# Patient Record
Sex: FEMALE | Race: BLACK OR AFRICAN AMERICAN | Employment: UNEMPLOYED | URBAN - METROPOLITAN AREA
[De-identification: names, ages, dates, MRNs, and addresses within clinical notes are randomized per-mention and may not be internally consistent; named-entity substitution may affect disease eponyms.]

---

## 2018-08-20 ENCOUNTER — HOSPITAL ENCOUNTER (EMERGENCY)
Age: 63
Discharge: PSYCHIATRIC OR SUBSTANCE USE FACILITY | End: 2018-08-21
Attending: EMERGENCY MEDICINE
Payer: MEDICARE

## 2018-08-20 DIAGNOSIS — F32.A ACUTE DEPRESSION: ICD-10-CM

## 2018-08-20 DIAGNOSIS — R45.851 SUICIDAL IDEATION: Primary | ICD-10-CM

## 2018-08-20 LAB
ALBUMIN SERPL-MCNC: 4.2 G/DL (ref 3.4–5)
ALBUMIN/GLOB SERPL: 1.1 {RATIO} (ref 0.8–1.7)
ALP SERPL-CCNC: 70 U/L (ref 45–117)
ALT SERPL-CCNC: 22 U/L (ref 13–56)
AMPHET UR QL SCN: NEGATIVE
ANION GAP SERPL CALC-SCNC: 10 MMOL/L (ref 3–18)
APAP SERPL-MCNC: <2 UG/ML (ref 10–30)
APPEARANCE UR: CLEAR
AST SERPL-CCNC: 17 U/L (ref 15–37)
BACTERIA URNS QL MICRO: NEGATIVE /HPF
BARBITURATES UR QL SCN: NEGATIVE
BASOPHILS # BLD: 0 K/UL (ref 0–0.1)
BASOPHILS NFR BLD: 0 % (ref 0–2)
BENZODIAZ UR QL: NEGATIVE
BILIRUB SERPL-MCNC: 0.8 MG/DL (ref 0.2–1)
BILIRUB UR QL: NEGATIVE
BUN SERPL-MCNC: 13 MG/DL (ref 7–18)
BUN/CREAT SERPL: 15 (ref 12–20)
CALCIUM SERPL-MCNC: 9.8 MG/DL (ref 8.5–10.1)
CANNABINOIDS UR QL SCN: NEGATIVE
CHLORIDE SERPL-SCNC: 108 MMOL/L (ref 100–108)
CO2 SERPL-SCNC: 23 MMOL/L (ref 21–32)
COCAINE UR QL SCN: NEGATIVE
COLOR UR: YELLOW
CREAT SERPL-MCNC: 0.89 MG/DL (ref 0.6–1.3)
DIFFERENTIAL METHOD BLD: ABNORMAL
EOSINOPHIL # BLD: 0 K/UL (ref 0–0.4)
EOSINOPHIL NFR BLD: 1 % (ref 0–5)
EPITH CASTS URNS QL MICRO: NORMAL /LPF (ref 0–5)
ERYTHROCYTE [DISTWIDTH] IN BLOOD BY AUTOMATED COUNT: 15 % (ref 11.6–14.5)
ETHANOL SERPL-MCNC: <3 MG/DL (ref 0–3)
GLOBULIN SER CALC-MCNC: 3.9 G/DL (ref 2–4)
GLUCOSE SERPL-MCNC: 128 MG/DL (ref 74–99)
GLUCOSE UR STRIP.AUTO-MCNC: NEGATIVE MG/DL
HCT VFR BLD AUTO: 40.3 % (ref 35–45)
HDSCOM,HDSCOM: NORMAL
HGB BLD-MCNC: 13.6 G/DL (ref 12–16)
HGB UR QL STRIP: NEGATIVE
KETONES UR QL STRIP.AUTO: NEGATIVE MG/DL
LEUKOCYTE ESTERASE UR QL STRIP.AUTO: NEGATIVE
LYMPHOCYTES # BLD: 1.2 K/UL (ref 0.9–3.6)
LYMPHOCYTES NFR BLD: 23 % (ref 21–52)
MCH RBC QN AUTO: 28.9 PG (ref 24–34)
MCHC RBC AUTO-ENTMCNC: 33.7 G/DL (ref 31–37)
MCV RBC AUTO: 85.7 FL (ref 74–97)
METHADONE UR QL: NEGATIVE
MONOCYTES # BLD: 0.4 K/UL (ref 0.05–1.2)
MONOCYTES NFR BLD: 8 % (ref 3–10)
NEUTS SEG # BLD: 3.6 K/UL (ref 1.8–8)
NEUTS SEG NFR BLD: 68 % (ref 40–73)
NITRITE UR QL STRIP.AUTO: NEGATIVE
OPIATES UR QL: NEGATIVE
PCP UR QL: NEGATIVE
PH UR STRIP: 5.5 [PH] (ref 5–8)
PLATELET # BLD AUTO: 170 K/UL (ref 135–420)
PMV BLD AUTO: 10.4 FL (ref 9.2–11.8)
POTASSIUM SERPL-SCNC: 3.9 MMOL/L (ref 3.5–5.5)
PROT SERPL-MCNC: 8.1 G/DL (ref 6.4–8.2)
PROT UR STRIP-MCNC: 30 MG/DL
RBC # BLD AUTO: 4.7 M/UL (ref 4.2–5.3)
RBC #/AREA URNS HPF: 0 /HPF (ref 0–5)
SALICYLATES SERPL-MCNC: <2.8 MG/DL (ref 2.8–20)
SODIUM SERPL-SCNC: 141 MMOL/L (ref 136–145)
SP GR UR REFRACTOMETRY: 1.02 (ref 1–1.03)
UROBILINOGEN UR QL STRIP.AUTO: 0.2 EU/DL (ref 0.2–1)
WBC # BLD AUTO: 5.3 K/UL (ref 4.6–13.2)
WBC URNS QL MICRO: NORMAL /HPF (ref 0–4)

## 2018-08-20 PROCEDURE — 99285 EMERGENCY DEPT VISIT HI MDM: CPT

## 2018-08-20 PROCEDURE — 81001 URINALYSIS AUTO W/SCOPE: CPT | Performed by: EMERGENCY MEDICINE

## 2018-08-20 PROCEDURE — 80307 DRUG TEST PRSMV CHEM ANLYZR: CPT | Performed by: EMERGENCY MEDICINE

## 2018-08-20 PROCEDURE — 85025 COMPLETE CBC W/AUTO DIFF WBC: CPT | Performed by: EMERGENCY MEDICINE

## 2018-08-20 PROCEDURE — 74011250637 HC RX REV CODE- 250/637: Performed by: EMERGENCY MEDICINE

## 2018-08-20 PROCEDURE — 80053 COMPREHEN METABOLIC PANEL: CPT | Performed by: EMERGENCY MEDICINE

## 2018-08-20 RX ORDER — ACETAMINOPHEN 500 MG
1000 TABLET ORAL
Status: COMPLETED | OUTPATIENT
Start: 2018-08-20 | End: 2018-08-20

## 2018-08-20 RX ADMIN — ACETAMINOPHEN 1000 MG: 500 TABLET, FILM COATED ORAL at 20:24

## 2018-08-20 NOTE — ED PROVIDER NOTES
HPI Comments: Kristan Aguilar is a 66 y.o. Female who was brought in by family and pt self referred for increased depressive sx today along with feeling of self harm which is related to year anniversary of family member death. No particular plan or h/o prior admission for mental health, si. H/o depression and states she takes medication. Denies hallucinations. Sx are constant. Dec appetite, sleep. The history is provided by the patient. Past Medical History:   Diagnosis Date    Chronic kidney disease     Hearing loss     HTN (hypertension)     Hypercholesteremia     Leg cramps     Mixed hyperlipidemia        Past Surgical History:   Procedure Laterality Date    HX CATARACT REMOVAL      HX  SECTION           Family History:   Problem Relation Age of Onset    Heart Failure Father          Thyroid Disease Mother     Heart Failure Maternal Grandmother     Hypertension Maternal Grandmother        Social History     Social History    Marital status:      Spouse name: N/A    Number of children: N/A    Years of education: N/A     Occupational History    Not on file. Social History Main Topics    Smoking status: Never Smoker    Smokeless tobacco: Never Used    Alcohol use No    Drug use: No    Sexual activity: No     Other Topics Concern    Not on file     Social History Narrative    No narrative on file         ALLERGIES: Heparin (bovine) and Hydralazine    Review of Systems   Constitutional: Positive for appetite change and fatigue. Negative for fever. HENT: Negative for sore throat and trouble swallowing. Eyes: Negative for visual disturbance. Respiratory: Negative for cough and shortness of breath. Cardiovascular: Negative for chest pain. Gastrointestinal: Negative for abdominal pain. Endocrine: Negative for polyuria. Genitourinary: Negative for difficulty urinating. Musculoskeletal: Negative for gait problem. Skin: Negative for rash. Allergic/Immunologic: Negative for immunocompromised state. Neurological: Negative for syncope. Psychiatric/Behavioral: Positive for sleep disturbance and suicidal ideas. Negative for hallucinations. The patient is nervous/anxious. Vitals:    08/20/18 1904   BP: 106/77   Pulse: 90   Resp: 16   Temp: 97.3 °F (36.3 °C)   SpO2: 100%            Physical Exam   Constitutional: She is oriented to person, place, and time. She appears well-developed and well-nourished. She appears distressed (tearful in room. not ill appearance.). HENT:   Head: Normocephalic and atraumatic. Right Ear: External ear normal.   Left Ear: External ear normal.   Nose: Nose normal.   Mouth/Throat: Uvula is midline, oropharynx is clear and moist and mucous membranes are normal.   Eyes: Conjunctivae are normal. No scleral icterus. Neck: Neck supple. Cardiovascular: Normal rate, regular rhythm, normal heart sounds and intact distal pulses. Pulmonary/Chest: Effort normal and breath sounds normal.   Abdominal: Soft. There is no tenderness. Musculoskeletal: She exhibits no edema. Neurological: She is alert and oriented to person, place, and time. Gait normal.   Skin: Skin is warm and dry. She is not diaphoretic. Psychiatric: Her speech is normal and behavior is normal. Her mood appears anxious. Thought content is not paranoid and not delusional. She exhibits a depressed mood. She expresses suicidal ideation. She expresses no homicidal ideation. She expresses no suicidal plans and no homicidal plans. Nursing note and vitals reviewed.        [facility]      ED Course       Procedures  Vitals:  Patient Vitals for the past 12 hrs:   Temp Pulse Resp BP SpO2   08/20/18 1904 97.3 °F (36.3 °C) 90 16 106/77 100 %         Medications ordered:   Medications   acetaminophen (TYLENOL) tablet 1,000 mg (1,000 mg Oral Given 8/20/18 2024)         Lab findings:  Recent Results (from the past 12 hour(s))   URINALYSIS W/ RFLX MICROSCOPIC Collection Time: 08/20/18  7:15 PM   Result Value Ref Range    Color YELLOW      Appearance CLEAR      Specific gravity 1.018 1.005 - 1.030      pH (UA) 5.5 5.0 - 8.0      Protein 30 (A) NEG mg/dL    Glucose NEGATIVE  NEG mg/dL    Ketone NEGATIVE  NEG mg/dL    Bilirubin NEGATIVE  NEG      Blood NEGATIVE  NEG      Urobilinogen 0.2 0.2 - 1.0 EU/dL    Nitrites NEGATIVE  NEG      Leukocyte Esterase NEGATIVE  NEG     DRUG SCREEN, URINE    Collection Time: 08/20/18  7:15 PM   Result Value Ref Range    BENZODIAZEPINES NEGATIVE  NEG      BARBITURATES NEGATIVE  NEG      THC (TH-CANNABINOL) NEGATIVE  NEG      OPIATES NEGATIVE  NEG      PCP(PHENCYCLIDINE) NEGATIVE  NEG      COCAINE NEGATIVE  NEG      AMPHETAMINES NEGATIVE  NEG      METHADONE NEGATIVE  NEG      HDSCOM (NOTE)    URINE MICROSCOPIC ONLY    Collection Time: 08/20/18  7:15 PM   Result Value Ref Range    WBC 0 to 2 0 - 4 /hpf    RBC 0 0 - 5 /hpf    Epithelial cells FEW 0 - 5 /lpf    Bacteria NEGATIVE  NEG /hpf   CBC WITH AUTOMATED DIFF    Collection Time: 08/20/18  7:16 PM   Result Value Ref Range    WBC 5.3 4.6 - 13.2 K/uL    RBC 4.70 4.20 - 5.30 M/uL    HGB 13.6 12.0 - 16.0 g/dL    HCT 40.3 35.0 - 45.0 %    MCV 85.7 74.0 - 97.0 FL    MCH 28.9 24.0 - 34.0 PG    MCHC 33.7 31.0 - 37.0 g/dL    RDW 15.0 (H) 11.6 - 14.5 %    PLATELET 955 665 - 860 K/uL    MPV 10.4 9.2 - 11.8 FL    NEUTROPHILS 68 40 - 73 %    LYMPHOCYTES 23 21 - 52 %    MONOCYTES 8 3 - 10 %    EOSINOPHILS 1 0 - 5 %    BASOPHILS 0 0 - 2 %    ABS. NEUTROPHILS 3.6 1.8 - 8.0 K/UL    ABS. LYMPHOCYTES 1.2 0.9 - 3.6 K/UL    ABS. MONOCYTES 0.4 0.05 - 1.2 K/UL    ABS. EOSINOPHILS 0.0 0.0 - 0.4 K/UL    ABS.  BASOPHILS 0.0 0.0 - 0.1 K/UL    DF AUTOMATED     METABOLIC PANEL, COMPREHENSIVE    Collection Time: 08/20/18  7:16 PM   Result Value Ref Range    Sodium 141 136 - 145 mmol/L    Potassium 3.9 3.5 - 5.5 mmol/L    Chloride 108 100 - 108 mmol/L    CO2 23 21 - 32 mmol/L    Anion gap 10 3.0 - 18 mmol/L    Glucose 128 (H) 74 - 99 mg/dL    BUN 13 7.0 - 18 MG/DL    Creatinine 0.89 0.6 - 1.3 MG/DL    BUN/Creatinine ratio 15 12 - 20      GFR est AA >60 >60 ml/min/1.73m2    GFR est non-AA >60 >60 ml/min/1.73m2    Calcium 9.8 8.5 - 10.1 MG/DL    Bilirubin, total 0.8 0.2 - 1.0 MG/DL    ALT (SGPT) 22 13 - 56 U/L    AST (SGOT) 17 15 - 37 U/L    Alk. phosphatase 70 45 - 117 U/L    Protein, total 8.1 6.4 - 8.2 g/dL    Albumin 4.2 3.4 - 5.0 g/dL    Globulin 3.9 2.0 - 4.0 g/dL    A-G Ratio 1.1 0.8 - 1.7     ETHYL ALCOHOL    Collection Time: 08/20/18  7:16 PM   Result Value Ref Range    ALCOHOL(ETHYL),SERUM <3 0 - 3 MG/DL   SALICYLATE    Collection Time: 08/20/18  7:16 PM   Result Value Ref Range    Salicylate level <3.8 (L) 2.8 - 20.0 MG/DL   ACETAMINOPHEN    Collection Time: 08/20/18  7:16 PM   Result Value Ref Range    Acetaminophen level <2 (L) 10.0 - 30.0 ug/mL       EKG interpretation by ED Physician:      X-Ray, CT or other radiology findings or impressions:  No orders to display       Progress notes, Consult notes or additional Procedure notes:   Seen by telepsych who rec inpt treatment to which pt is voluntary  No acute medical condition that would prevent placement in mental health facility  Will t/o dr serrato at approx 6am to f/u on placement    Reevaluation of patient:   stable    Disposition:  Diagnosis:   1. Suicidal ideation    2. Acute depression        Disposition: pending placement    Follow-up Information     None            Patient's Medications   Start Taking    No medications on file   Continue Taking    ACETAMINOPHEN (TYLENOL PO)    Take  by mouth. AMLODIPINE (NORVASC) 5 MG TABLET    Take 5 mg by mouth daily. ATORVASTATIN (LIPITOR) 80 MG TABLET    Take 80 mg by mouth daily. AZILSARTAN MEDOXOMIL (EDARBI) 80 MG TAB    Take  by mouth. CHOLECALCIFEROL, VITAMIN D3, (VITAMIN D3) 2,000 UNIT TAB    Take  by mouth. DILTIAZEM CD (CARDIZEM CD) 120 MG ER CAPSULE    Take  by mouth daily. HYDROCODONE-ACETAMINOPHEN (NORCO) 5-325 MG PER TABLET    Take 1 Tab by mouth every six (6) hours as needed for Pain.    These Medications have changed    No medications on file   Stop Taking    No medications on file

## 2018-08-20 NOTE — ED TRIAGE NOTES
\"I feel suicidal. It's been an ongoing gamez but it's never been this bad. \" Per daughter-\"Today is the anniversary of my sister's passing. \"

## 2018-08-20 NOTE — ED NOTES
I performed a brief evaluation, including history and physical, of the patient here in triage and I have determined that pt will need further treatment and evaluation from the main side ER physician. I have placed initial orders to help in expediting patients care. August 20, 2018 at 7:05 PM - TORIBIO Loya      Visit Vitals    /77    Pulse 90    Temp 97.3 °F (36.3 °C)    Resp 16    SpO2 100%        Scribe Attestation     Stormy Gray acting as a scribe for and in the presence of TORIBIO Loya         August 20, 2018 at 7:05 PM       Provider Attestation:      I personally performed the services described in the documentation, reviewed the documentation, as recorded by the scribe in my presence, and it accurately and completely records my words and actions.  August 20, 2018 at 7:05 PM -  TORIBIO Loya

## 2018-08-20 NOTE — ED NOTES
Patient tearful. Daughter at bedside. Daughter reports that patient was in the bath tub and was suicidal. Patient admits to this.

## 2018-08-21 ENCOUNTER — HOSPITAL ENCOUNTER (INPATIENT)
Age: 63
LOS: 3 days | Discharge: HOME | DRG: 885 | End: 2018-08-24
Attending: PSYCHIATRY & NEUROLOGY | Admitting: PSYCHIATRY & NEUROLOGY
Payer: MEDICARE

## 2018-08-21 VITALS
RESPIRATION RATE: 18 BRPM | SYSTOLIC BLOOD PRESSURE: 147 MMHG | DIASTOLIC BLOOD PRESSURE: 102 MMHG | TEMPERATURE: 97.9 F | OXYGEN SATURATION: 96 % | HEART RATE: 76 BPM

## 2018-08-21 PROBLEM — F32.A DEPRESSION: Status: ACTIVE | Noted: 2018-08-21

## 2018-08-21 PROCEDURE — 65220000003 HC RM SEMIPRIVATE PSYCH

## 2018-08-21 PROCEDURE — 74011250637 HC RX REV CODE- 250/637: Performed by: EMERGENCY MEDICINE

## 2018-08-21 PROCEDURE — 74011250637 HC RX REV CODE- 250/637: Performed by: PSYCHIATRY & NEUROLOGY

## 2018-08-21 RX ORDER — ATENOLOL 50 MG/1
50 TABLET ORAL
Status: COMPLETED | OUTPATIENT
Start: 2018-08-21 | End: 2018-08-21

## 2018-08-21 RX ORDER — AMLODIPINE BESYLATE 5 MG/1
5 TABLET ORAL
Status: COMPLETED | OUTPATIENT
Start: 2018-08-21 | End: 2018-08-21

## 2018-08-21 RX ORDER — ATORVASTATIN CALCIUM 40 MG/1
80 TABLET, FILM COATED ORAL DAILY
Status: DISCONTINUED | OUTPATIENT
Start: 2018-08-22 | End: 2018-08-22

## 2018-08-21 RX ORDER — LORAZEPAM 1 MG/1
1 TABLET ORAL
Status: DISCONTINUED | OUTPATIENT
Start: 2018-08-21 | End: 2018-08-21

## 2018-08-21 RX ORDER — ADHESIVE BANDAGE
30 BANDAGE TOPICAL DAILY PRN
Status: DISCONTINUED | OUTPATIENT
Start: 2018-08-21 | End: 2018-08-24 | Stop reason: HOSPADM

## 2018-08-21 RX ORDER — ACETAMINOPHEN 325 MG/1
650 TABLET ORAL
Status: DISCONTINUED | OUTPATIENT
Start: 2018-08-21 | End: 2018-08-24 | Stop reason: HOSPADM

## 2018-08-21 RX ORDER — TELMISARTAN 40 MG/1
80 TABLET ORAL DAILY
Status: DISCONTINUED | OUTPATIENT
Start: 2018-08-22 | End: 2018-08-22

## 2018-08-21 RX ORDER — OLANZAPINE 5 MG/1
5 TABLET ORAL
Status: DISCONTINUED | OUTPATIENT
Start: 2018-08-21 | End: 2018-08-24 | Stop reason: HOSPADM

## 2018-08-21 RX ORDER — BENZTROPINE MESYLATE 2 MG/1
2 TABLET ORAL
Status: DISCONTINUED | OUTPATIENT
Start: 2018-08-21 | End: 2018-08-24 | Stop reason: HOSPADM

## 2018-08-21 RX ORDER — LORAZEPAM 2 MG/ML
2 INJECTION INTRAMUSCULAR
Status: DISCONTINUED | OUTPATIENT
Start: 2018-08-21 | End: 2018-08-21

## 2018-08-21 RX ORDER — AMLODIPINE BESYLATE 5 MG/1
5 TABLET ORAL DAILY
Status: DISCONTINUED | OUTPATIENT
Start: 2018-08-22 | End: 2018-08-23

## 2018-08-21 RX ORDER — IBUPROFEN 400 MG/1
400 TABLET ORAL
Status: DISCONTINUED | OUTPATIENT
Start: 2018-08-21 | End: 2018-08-24 | Stop reason: HOSPADM

## 2018-08-21 RX ORDER — ZOLPIDEM TARTRATE 10 MG/1
10 TABLET ORAL
Status: DISCONTINUED | OUTPATIENT
Start: 2018-08-21 | End: 2018-08-24 | Stop reason: HOSPADM

## 2018-08-21 RX ORDER — DILTIAZEM HYDROCHLORIDE 120 MG/1
120 CAPSULE, COATED, EXTENDED RELEASE ORAL DAILY
Status: DISCONTINUED | OUTPATIENT
Start: 2018-08-22 | End: 2018-08-22

## 2018-08-21 RX ORDER — BENZTROPINE MESYLATE 1 MG/ML
2 INJECTION INTRAMUSCULAR; INTRAVENOUS
Status: DISCONTINUED | OUTPATIENT
Start: 2018-08-21 | End: 2018-08-24 | Stop reason: HOSPADM

## 2018-08-21 RX ORDER — IBUPROFEN 200 MG
1 TABLET ORAL
Status: DISCONTINUED | OUTPATIENT
Start: 2018-08-21 | End: 2018-08-24 | Stop reason: HOSPADM

## 2018-08-21 RX ORDER — MELATONIN
2000 DAILY
Status: DISCONTINUED | OUTPATIENT
Start: 2018-08-22 | End: 2018-08-24 | Stop reason: HOSPADM

## 2018-08-21 RX ADMIN — AMLODIPINE BESYLATE 5 MG: 5 TABLET ORAL at 13:12

## 2018-08-21 RX ADMIN — ATENOLOL 50 MG: 50 TABLET ORAL at 13:11

## 2018-08-21 RX ADMIN — ZOLPIDEM TARTRATE 10 MG: 10 TABLET ORAL at 23:26
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
## 2018-08-21 NOTE — IP AVS SNAPSHOT
[address] [facility] 
 
 
 [address] [facility] Patient: Alfie Barahona MRN: [id number] :1955 A check tim indicates which time of day the medication should be taken. My Medications START taking these medications Instructions Each Dose to Equal  
 Morning Noon Evening Bedtime ARIPiprazole 2 mg tablet Commonly known as:  ABILIFY Start taking on:  2018 Your last dose was: Your next dose is: Take 1 Tab by mouth daily. Indications: DEPRESSION TREATMENT ADJUNCT  
 2 mg CHANGE how you take these medications Instructions Each Dose to Equal  
 Morning Noon Evening Bedtime * mirtazapine 15 mg tablet Commonly known as:  Israel Garcia What changed:  Another medication with the same name was added. Make sure you understand how and when to take each. Your last dose was: Your next dose is: Take 15 mg by mouth nightly. Indications: major depressive disorder 15 mg  
    
   
   
   
  
 * mirtazapine 15 mg tablet Commonly known as:  Israel Dolmckenzie What changed: You were already taking a medication with the same name, and this prescription was added. Make sure you understand how and when to take each. Your last dose was: Your next dose is: Take 1 Tab by mouth nightly. Indications: major depressive disorder 15 mg  
    
   
   
   
  
 sertraline 100 mg tablet Commonly known as:  ZOLOFT Start taking on:  2018 What changed:  how much to take Your last dose was: Your next dose is: Take 2 Tabs by mouth daily. Indications: major depressive disorder  
 200 mg * Notice: This list has 2 medication(s) that are the same as other medications prescribed for you. Read the directions carefully, and ask your doctor or other care provider to review them with you. CONTINUE taking these medications Instructions Each Dose to Equal  
 Morning Noon Evening Bedtime  
 acetaminophen 325 mg tablet Commonly known as:  TYLENOL Your last dose was: Your next dose is: Take 325 mg by mouth every four (4) hours as needed (headache). 325 mg  
    
   
   
   
  
 atenolol 50 mg tablet Commonly known as:  TENORMIN Your last dose was: Your next dose is: Take 50 mg by mouth daily. Indications: hypertension 50 mg CELLCEPT 250 mg capsule Generic drug:  mycophenolate mofetil Your last dose was: Your next dose is: Take 750 mg by mouth two (2) times a day. Indications: PREVENTION OF KIDNEY TRANSPLANT REJECTION  
 750 mg  
    
   
   
   
  
 lovastatin 20 mg tablet Commonly known as:  MEVACOR Your last dose was: Your next dose is: Take 20 mg by mouth nightly. Indications: hyperlipidemia 20 mg  
    
   
   
   
  
 NORVASC 5 mg tablet Generic drug:  amLODIPine Your last dose was: Your next dose is: Take 5 mg by mouth daily. Indications: hypertension 5 mg PEPCID 40 mg tablet Generic drug:  famotidine Your last dose was: Your next dose is: Take 40 mg by mouth nightly. Indications: gastroesophageal reflux disease 40 mg  
    
   
   
   
  
 predniSONE 5 mg tablet Commonly known as:  Sharlotte Corner Your last dose was: Your next dose is: Take 5 mg by mouth daily. Indications: PREVENTION OF KIDNEY TRANSPLANT REJECTION  
 5 mg  
    
   
   
   
  
 raNITIdine 150 mg tablet Commonly known as:  ZANTAC Your last dose was: Your next dose is: Take 150 mg by mouth daily. Indications: gastroesophageal reflux disease 150 mg  
    
   
   
   
  
 VITAMIN D3 2,000 unit Tab Generic drug:  cholecalciferol (vitamin D3) Your last dose was: Your next dose is: Take 2,000 Units by mouth daily. Indications: PREVENTION OF VITAMIN D DEFICIENCY  
 2000 Units ASK your doctor about these medications Instructions Each Dose to Equal  
 Morning Noon Evening Bedtime PROGRAF 1 mg capsule Generic drug:  tacrolimus Your last dose was: Your next dose is: Take 6 mg by mouth two (2) times a day. Indications: PREVENTION OF KIDNEY TRANSPLANT REJECTION  
 6 mg Where to Get Your Medications Information on where to get these meds will be given to you by the nurse or doctor. ! Ask your nurse or doctor about these medications ARIPiprazole 2 mg tablet  
 mirtazapine 15 mg tablet  
 sertraline 100 mg tablet

## 2018-08-21 NOTE — ED NOTES
Patient dressed in paper scrubs. Clothing collected and placed in locker # 3 Other belongings with daughter (name on inventory form). Patient wears glasses and they are left at bedside. See paper chart statement for inventory and frequent visual checks. Sitter at bedside. Emotional support given to patient.
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
## 2018-08-21 NOTE — IP AVS SNAPSHOT
[facility] 
 
 
 [address] Ev[address] Lay[address] Lira Patient: Estelita Lepe MRN: [id number] :1955 About your hospitalization You were admitted on:  2018 You last received care in the:  [facility]. 291 You were discharged on:  2018 Why you were hospitalized Your primary diagnosis was: Major Depressive Disorder, Recurrent Episode, Severe (Hcc) Your diagnoses also included:  Chronic Kidney Disease Follow-up Information Follow up With Details Comments Contact Info [facility]  Appointment Christine Davis MD 18 @ 10:30AM   [address] [place] [address], [address] [place] [address] 
[phone] Discharge RN  Please return patient's creams to her on discharge. Creams are located in the patient specific bin in the medication room. Jaja Victoria MD   [address] [place] [id number] [phone] Discharge Orders None A check tim indicates which time of day the medication should be taken. My Medications START taking these medications Instructions Each Dose to Equal  
 Morning Noon Evening Bedtime ARIPiprazole 2 mg tablet Commonly known as:  ABILIFY Start taking on:  2018 Your last dose was: Your next dose is: Take 1 Tab by mouth daily. Indications: DEPRESSION TREATMENT ADJUNCT  
 2 mg CHANGE how you take these medications Instructions Each Dose to Equal  
 Morning Noon Evening Bedtime * mirtazapine 15 mg tablet Commonly known as:  Veronique Thomas What changed:  Another medication with the same name was added. Make sure you understand how and when to take each. Your last dose was: Your next dose is: Take 15 mg by mouth nightly. Indications: major depressive disorder  15 mg  
    
   
   
   
  
 * mirtazapine 15 mg tablet Commonly known as:  Harshal Bueno What changed: You were already taking a medication with the same name, and this prescription was added. Make sure you understand how and when to take each. Your last dose was: Your next dose is: Take 1 Tab by mouth nightly. Indications: major depressive disorder 15 mg  
    
   
   
   
  
 sertraline 100 mg tablet Commonly known as:  ZOLOFT Start taking on:  8/25/2018 What changed:  how much to take Your last dose was: Your next dose is: Take 2 Tabs by mouth daily. Indications: major depressive disorder  
 200 mg * Notice: This list has 2 medication(s) that are the same as other medications prescribed for you. Read the directions carefully, and ask your doctor or other care provider to review them with you. CONTINUE taking these medications Instructions Each Dose to Equal  
 Morning Noon Evening Bedtime  
 acetaminophen 325 mg tablet Commonly known as:  TYLENOL Your last dose was: Your next dose is: Take 325 mg by mouth every four (4) hours as needed (headache). 325 mg  
    
   
   
   
  
 atenolol 50 mg tablet Commonly known as:  TENORMIN Your last dose was: Your next dose is: Take 50 mg by mouth daily. Indications: hypertension 50 mg CELLCEPT 250 mg capsule Generic drug:  mycophenolate mofetil Your last dose was: Your next dose is: Take 750 mg by mouth two (2) times a day. Indications: PREVENTION OF KIDNEY TRANSPLANT REJECTION  
 750 mg  
    
   
   
   
  
 lovastatin 20 mg tablet Commonly known as:  MEVACOR Your last dose was: Your next dose is: Take 20 mg by mouth nightly. Indications: hyperlipidemia 20 mg  
    
   
   
   
  
 NORVASC 5 mg tablet Generic drug:  amLODIPine Your last dose was: Your next dose is: Take 5 mg by mouth daily. Indications: hypertension 5 mg PEPCID 40 mg tablet Generic drug:  famotidine Your last dose was: Your next dose is: Take 40 mg by mouth nightly. Indications: gastroesophageal reflux disease 40 mg  
    
   
   
   
  
 predniSONE 5 mg tablet Commonly known as:  Anna Pound Your last dose was: Your next dose is: Take 5 mg by mouth daily. Indications: PREVENTION OF KIDNEY TRANSPLANT REJECTION  
 5 mg  
    
   
   
   
  
 raNITIdine 150 mg tablet Commonly known as:  ZANTAC Your last dose was: Your next dose is: Take 150 mg by mouth daily. Indications: gastroesophageal reflux disease 150 mg  
    
   
   
   
  
 VITAMIN D3 2,000 unit Tab Generic drug:  cholecalciferol (vitamin D3) Your last dose was: Your next dose is: Take 2,000 Units by mouth daily. Indications: PREVENTION OF VITAMIN D DEFICIENCY  
 2000 Units ASK your doctor about these medications Instructions Each Dose to Equal  
 Morning Noon Evening Bedtime PROGRAF 1 mg capsule Generic drug:  tacrolimus Your last dose was: Your next dose is: Take 6 mg by mouth two (2) times a day. Indications: PREVENTION OF KIDNEY TRANSPLANT REJECTION  
 6 mg Where to Get Your Medications Information on where to get these meds will be given to you by the nurse or doctor. ! Ask your nurse or doctor about these medications ARIPiprazole 2 mg tablet  
 mirtazapine 15 mg tablet  
 sertraline 100 mg tablet Discharge Instructions DISCHARGE SUMMARY from Nurse The following personal items are in your possession at time of discharge: 
 
Dental Appliances: None Visual Aid: Glasses, With patient Home Medications: None Jewelry: With patient Clothing: At bedside Other Valuables: None PATIENT INSTRUCTIONS: 
 
If I feel that I can not keep these promises and I am at risk of hurting myself or others, I will call the crisis office and speak with a crisis worker who will assist me during my crisis. [facility]  [phone] [address]   [phone] [address] [place] [facility]  [phone] [facility]  [phone] Lifestyle Tips: 
Appointment after discharge and follow up phone call: After being discharged, if your appointment does not work for you, please feel free to contact the physician's office to change the appointment. Medications: Take your medicines exactly as instructed. Ask your doctor or nurse if you have questions about your medicines. Tell your doctor right away if you have problems with your medicines. Activity: 
 Ask your doctor how active you should be. Remember to take rest breaks. Do not cross your legs when sitting. Elevate your legs when you can. Carry a list of your medications, allergies and the shots you have had These are general instructions for a healthy lifestyle: No smoking/ No tobacco products/ Avoid exposure to second hand smoke Surgeon General's Warning:  Quitting smoking now greatly reduces serious risk to your health. Obesity, smoking, and sedentary lifestyle greatly increases your risk for illness A healthy diet, regular physical exercise & weight monitoring are important for maintaining a healthy lifestyle Recognize signs and symptoms of STROKE: 
 
F-face looks uneven A-arms unable to move or move unevenly S-speech slurred or non-existent T-time-call 911 as soon as signs and symptoms begin-DO NOT go Back to bed or wait to see if you get better-TIME IS BRAIN. Warning Signs of HEART ATTACK Call 911 if you have these symptoms: ? Chest discomfort. Most heart attacks involve discomfort in the center of the chest that lasts more than a few minutes, or that goes away and comes back. It can feel like uncomfortable pressure, squeezing, fullness, or pain. ? Discomfort in other areas of the upper body. Symptoms can include pain or discomfort in one or both arms, the back, neck, jaw, or stomach. ? Shortness of breath with or without chest discomfort. ? Other signs may include breaking out in a cold sweat, nausea, or lightheadedness. Don't wait more than five minutes to call [address]! Fast action can save your life. Calling 911 is almost always the fastest way to get lifesaving treatment. Emergency Medical Services staff can begin treatment when they arrive  up to an hour sooner than if someone gets to the hospital by car. Myself and/or my family have received education about my diagnosis throughout my hospital stay. During my hospital stay, I and/or my family/caregiver were included in planning my care upon discharge. My needs were taken into consideration and I was included in my discharge planning. I had an opportunity to ask questions. The discharge information has been reviewed with the patient. The patient verbalized understanding. Discharge medications reviewed with the patient and appropriate educational materials and side effects teaching were provided. [employer] Announcement We are excited to announce that we are making your provider's discharge notes available to you in Joberator. You will see these notes when they are completed and signed by the physician that discharged you from your recent hospital stay. If you have any questions or concerns about any information you see in [employer], please call the Health Information Department where you were seen or reach out to your Primary Care Provider for more information about your plan of care. Introducing [facility]! Shaniameagan Dahl introduces [employer] patient portal. Now you can access parts of your medical record, email your doctor's office, and request medication refills online. 1. In your internet browser, go to [email or web address] 2. Click on the First Time User? Click Here link in the Sign In box. You will see the New Member Sign Up page. 3. Enter your Vibease Access Code exactly as it appears below. You will not need to use this code after youve completed the sign-up process. If you do not sign up before the expiration date, you must request a new code. · Vibease Access Code: [email or web address] Expires: 11/18/2018  7:02 PM 
 
4. Enter the last four digits of your Social Security Number (xxxx) and Date of Birth (mm/dd/yyyy) as indicated and click Submit. You will be taken to the next sign-up page. 5. Create a Vibease ID. This will be your Vibease login ID and cannot be changed, so think of one that is secure and easy to remember. 6. Create a Vibease password. You can change your password at any time. 7. Enter your Password Reset Question and Answer. This can be used at a later time if you forget your password. 8. Enter your e-mail address. You will receive e-mail notification when new information is available in [address] 9. Click Sign Up. You can now view and download portions of your medical record. 10. Click the Download Summary menu link to download a portable copy of your medical information. If you have questions, please visit the Frequently Asked Questions section of the Vibease website. Remember, Vibease is NOT to be used for urgent needs. For medical emergencies, dial 911. Now available from your iPhone and Android! Introducing Anders Miguel As a [facility] patient, I wanted to make you aware of our electronic visit tool called Anders Miguel. [facility] 24/7 allows you to connect within minutes with a medical provider 24 hours a day, seven days a week via a mobile device or tablet or logging into a secure website from your computer. You can access [employer] from anywhere in the [place]. A virtual visit might be right for you when you have a simple condition and feel like you just dont want to get out of bed, or cant get away from work for an appointment, when your regular [facility] provider is not available (evenings, weekends or holidays), or when youre out of town and need minor care. Electronic visits cost only $49 and if the [facility] 24/7 provider determines a prescription is needed to treat your condition, one can be electronically transmitted to a nearby pharmacy*. Please take a moment to enroll today if you have not already done so. The enrollment process is free and takes just a few minutes. To enroll, please download the [employer] 24/7 anjali to your tablet or phone, or visit [email or web address] to enroll on your computer. And, as an [address] [place] [address] patient with a [employer] account, the results of your visits will be scanned into your electronic medical record and your primary care provider will be able to view the scanned results. We urge you to continue to see your regular [facility] provider for your ongoing medical care. And while your primary care provider may not be the one available when you seek a Anders Ronyvinnie virtual visit, the peace of mind you get from getting a real diagnosis real time can be priceless. For more information on [employer], view our Frequently Asked Questions (FAQs) at [email or web address]. Sincerely, 
 
Vick Reveles MD 
Chief Medical Officer [employer] *:  certain medications cannot be prescribed via Anders Ronyvinnie Providers Seen During Your Hospitalization Provider Specialty Primary office phone Shashank Slaughter MD Psychiatry [phone] Broderick Escudero MD Psychiatry [phone] Your Primary Care Physician (PCP) Primary Care Physician Office Phone [facility]ax [facility] [address] [phone] You are allergic to the following Allergen Reactions Heparin (Bovine) Hives Hydralazine Hives Recent Documentation Height Weight BMI OB Status Smoking Status 1.6 m 67 kg 26.16 kg/m2 Postmenopausal Never Smoker Emergency Contacts Name Discharge Info Relation Home Work Mobile Yair Carroll DISCHARGE CAREGIVER [3] Friend [5] [phone] Patient Belongings The following personal items are in your possession at time of discharge: 
  Dental Appliances: None  Visual Aid: Glasses, With patient      Home Medications: None   Jewelry: With patient  Clothing: At bedside    Other Valuables: None Please provide this summary of care documentation to your next provider. Signatures-by signing, you are acknowledging that this After Visit Summary has been reviewed with you and you have received a copy. Patient Signature:  ____________________________________________________________ Date:  ____________________________________________________________  
  
Wendy Vega Provider Signature:  ____________________________________________________________ Date:  ____________________________________________________________

## 2018-08-21 NOTE — ED NOTES
6:11 AM :Pt care assumed from Dr. Ramos Osborn, ED provider. Pt complaint(s), current treatment plan, progression and available diagnostic results have been discussed thoroughly. Rounding occurred: yes  Intended Disposition: ADMIT   Pending diagnostic reports and/or labs (please list): Placement. 11:00 AM: Patient accepted to [facility] by Dr. Sosa Avila (Bed 321-1). 1:03 PM: Patient requests BP medications. Given home medications, Atenolol and Norvasc. Scribe Attestation     José Miguel acting as a scribe for and in the presence of Shyanne Ennis MD      August 21, 2018 at [facility]       Provider Attestation:      I personally performed the services described in the documentation, reviewed the documentation, as recorded by the scribe in my presence, and it accurately and completely records my words and actions.  August 21, 2018 at [place] [address] Shyanne Ennis MD

## 2018-08-21 NOTE — PROGRESS NOTES
[facility] resident    0955--Referred to [facility]. Kitty Hilarionai wants to know if patient is willing to go to [address] [place] [address] Rd with patient/daughter at bedside. Patient and daughter in agreement to go to [facility]. Made Emily aware. Will review and get back with me.     1045--Accepted at [facility]   Address: [place] [address], [facility], [address]  Bed 321-1  Accepting MD : Dr Stu Beltrán    Nurse to call report at 11:30 (cannot accept report before 11:30): [phone]    Updated patient who is in agreement, Dr Aayush Sarkar, Nurse Shyam Oliva and unit secretary    Kaley Singer  [phone]  [phone]

## 2018-08-21 NOTE — BH NOTES
The patient arrived on the unit via stroller. She is alert and oriented times three. She has a flat depressed affect and very quiet spoken. The patient wears glasses. She was body searched by Melba Carr. The patient has total bodyHer clothing was looked trough and put in the closet. Eczema and a tatto on the left upper breast..

## 2018-08-22 PROBLEM — F33.2 MAJOR DEPRESSIVE DISORDER, RECURRENT EPISODE, SEVERE (HCC): Status: ACTIVE | Noted: 2018-08-21

## 2018-08-22 PROCEDURE — 74011250636 HC RX REV CODE- 250/636: Performed by: INTERNAL MEDICINE

## 2018-08-22 PROCEDURE — 74011636637 HC RX REV CODE- 636/637: Performed by: INTERNAL MEDICINE

## 2018-08-22 PROCEDURE — 65220000003 HC RM SEMIPRIVATE PSYCH

## 2018-08-22 PROCEDURE — 74011250637 HC RX REV CODE- 250/637: Performed by: INTERNAL MEDICINE

## 2018-08-22 PROCEDURE — 74011250637 HC RX REV CODE- 250/637: Performed by: PSYCHIATRY & NEUROLOGY

## 2018-08-22 RX ORDER — MIRTAZAPINE 15 MG/1
15 TABLET, FILM COATED ORAL
COMMUNITY

## 2018-08-22 RX ORDER — TACROLIMUS 1 MG/1
6 CAPSULE ORAL 2 TIMES DAILY
COMMUNITY

## 2018-08-22 RX ORDER — PREDNISONE 5 MG/1
5 TABLET ORAL DAILY
COMMUNITY

## 2018-08-22 RX ORDER — ATENOLOL 50 MG/1
50 TABLET ORAL DAILY
COMMUNITY

## 2018-08-22 RX ORDER — FAMOTIDINE 20 MG/1
40 TABLET, FILM COATED ORAL
Status: DISCONTINUED | OUTPATIENT
Start: 2018-08-22 | End: 2018-08-24 | Stop reason: HOSPADM

## 2018-08-22 RX ORDER — SERTRALINE HYDROCHLORIDE 100 MG/1
150 TABLET, FILM COATED ORAL DAILY
COMMUNITY
End: 2018-08-24

## 2018-08-22 RX ORDER — MYCOPHENOLATE MOFETIL 250 MG/1
750 CAPSULE ORAL 2 TIMES DAILY
Status: DISCONTINUED | OUTPATIENT
Start: 2018-08-22 | End: 2018-08-24 | Stop reason: HOSPADM

## 2018-08-22 RX ORDER — MYCOPHENOLATE MOFETIL 250 MG/1
750 CAPSULE ORAL 2 TIMES DAILY
COMMUNITY

## 2018-08-22 RX ORDER — PRAVASTATIN SODIUM 10 MG/1
20 TABLET ORAL
Status: DISCONTINUED | OUTPATIENT
Start: 2018-08-22 | End: 2018-08-24 | Stop reason: HOSPADM

## 2018-08-22 RX ORDER — MIRTAZAPINE 15 MG/1
15 TABLET, FILM COATED ORAL
Status: DISCONTINUED | OUTPATIENT
Start: 2018-08-22 | End: 2018-08-24 | Stop reason: HOSPADM

## 2018-08-22 RX ORDER — LOVASTATIN 20 MG/1
20 TABLET ORAL
COMMUNITY

## 2018-08-22 RX ORDER — SERTRALINE HYDROCHLORIDE 50 MG/1
200 TABLET, FILM COATED ORAL DAILY
Status: DISCONTINUED | OUTPATIENT
Start: 2018-08-22 | End: 2018-08-24 | Stop reason: HOSPADM

## 2018-08-22 RX ORDER — PREDNISONE 5 MG/1
5 TABLET ORAL DAILY
Status: DISCONTINUED | OUTPATIENT
Start: 2018-08-22 | End: 2018-08-24 | Stop reason: HOSPADM

## 2018-08-22 RX ORDER — FAMOTIDINE 40 MG/1
40 TABLET, FILM COATED ORAL
COMMUNITY

## 2018-08-22 RX ORDER — ATENOLOL 50 MG/1
50 TABLET ORAL DAILY
Status: DISCONTINUED | OUTPATIENT
Start: 2018-08-22 | End: 2018-08-23

## 2018-08-22 RX ORDER — ACETAMINOPHEN 325 MG/1
325 TABLET ORAL
COMMUNITY

## 2018-08-22 RX ORDER — RANITIDINE 150 MG/1
150 TABLET, FILM COATED ORAL DAILY
COMMUNITY

## 2018-08-22 RX ORDER — TACROLIMUS 1 MG/1
6 CAPSULE ORAL EVERY 12 HOURS
Status: DISCONTINUED | OUTPATIENT
Start: 2018-08-22 | End: 2018-08-24 | Stop reason: HOSPADM

## 2018-08-22 RX ADMIN — AMLODIPINE BESYLATE 5 MG: 5 TABLET ORAL at 10:45

## 2018-08-22 RX ADMIN — MYCOPHENOLATE MOFETIL 750 MG: 250 CAPSULE ORAL at 10:46

## 2018-08-22 RX ADMIN — VITAMIN D 2000 UNITS: 25 TAB ORAL at 10:45

## 2018-08-22 RX ADMIN — MYCOPHENOLATE MOFETIL 750 MG: 250 CAPSULE ORAL at 21:58

## 2018-08-22 RX ADMIN — TACROLIMUS 6 MG: 1 CAPSULE ORAL at 10:46

## 2018-08-22 RX ADMIN — ATENOLOL 50 MG: 50 TABLET ORAL at 10:45

## 2018-08-22 RX ADMIN — MIRTAZAPINE 15 MG: 15 TABLET, FILM COATED ORAL at 21:58

## 2018-08-22 RX ADMIN — PRAVASTATIN SODIUM 20 MG: 10 TABLET ORAL at 21:58

## 2018-08-22 RX ADMIN — SERTRALINE HYDROCHLORIDE 200 MG: 50 TABLET ORAL at 18:00

## 2018-08-22 RX ADMIN — FAMOTIDINE 40 MG: 20 TABLET, FILM COATED ORAL at 21:58

## 2018-08-22 RX ADMIN — TACROLIMUS 6 MG: 1 CAPSULE ORAL at 21:58

## 2018-08-22 RX ADMIN — PREDNISONE 5 MG: 5 TABLET ORAL at 10:45

## 2018-08-22 NOTE — PROGRESS NOTES
Problem: Depressed Mood (Adult/Pediatric)  Goal: *STG: Attends activities and groups  Outcome: Progressing Towards Goal  Patient has attended groups; patient required some encouragement for participation. Goal: *STG: Remains safe in hospital  Outcome: Progressing Towards Goal  Q15 min checks performed for safety. Patient has been visible in the milieu. Patient affect is anxious; mood depressed. Patient denied SI/HI.

## 2018-08-22 NOTE — BH NOTES
GROUP THERAPY PROGRESS NOTE    The patient Charisma willson 66 y.o. female is participating in Coping Skills Group. Group time: 45 minutes    Personal goal for participation: To participate in relaxation activity    Goal orientation:  relaxation    Group therapy participation: active    Therapeutic interventions reviewed and discussed: favorite ways to relax    Impression of participation:  The patient was attentive.     Estephania Sickle  8/22/2018  2:02 PM

## 2018-08-22 NOTE — BH NOTES
Patient remained calm and quiet. Patient slept for 6 hours, breathing is unlabored. There is no noticeable distress at this time. Staff will continue to monitor patient. Q 15 minutes check maintained for safety.

## 2018-08-22 NOTE — BH NOTES
GROUP THERAPY PROGRESS NOTE    The patient Kevin willson 66 y.o. female is participating in Creative Expression Group. Group time: 1 hour    Personal goal for participation: To concentrate on selected task    Goal orientation: social    Group therapy participation: active    Therapeutic interventions reviewed and discussed: Crafts, games, music    Impression of participation: The patient was attentive.     Myrtle Garcias  8/22/2018  5:43 PM

## 2018-08-22 NOTE — BH NOTES
Pt examined by Dr. Eliza Quezada and was there to hear about her issues. Her daughter  suddenly in , , while deployed. Also  Told me she is adamant about her family not doing drugs. She recently  Found her grandson who is 12, smoked mj. Crying while describing her  Depression. Engaged with reflection group/snack. Will continue to   Monitor patient's status and assess needs.
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
## 2018-08-22 NOTE — PROGRESS NOTES
[facility] Medication Reconciliation     Recommendations/Findings:   1) Additions: atenolol, lovastatin, mirtazapine, prednisone, sertraline, tacrolimus, mycophenolate, famotidine, ranitidine  2) Modifications: acetaminophen, cholecalciferol   3) Deletions: atorvastatin, diltiazem CD, hydrocodone-acetaminophen, azilsartan    Total Time Spent: 40 minutes     Information obtained from: Patient interview, [address], [address] ([phone])    Patient allergies: Allergies as of 08/21/2018 - Review Complete 05/19/2013   Allergen Reaction Noted    Heparin (bovine) Hives 05/21/2012    Hydralazine Hives 05/21/2012       Prior to Admission Medications   Prescriptions Last Dose Informant Patient Reported? Taking?   acetaminophen (TYLENOL) 325 mg tablet   Yes Yes   Sig: Take 325 mg by mouth every four (4) hours as needed (headache). amLODIPine (NORVASC) 5 mg tablet   Yes Yes   Sig: Take 5 mg by mouth daily. Indications: hypertension   atenolol (TENORMIN) 50 mg tablet   Yes Yes   Sig: Take 50 mg by mouth daily. Indications: hypertension   cholecalciferol, vitamin D3, (VITAMIN D3) 2,000 unit Tab   Yes Yes   Sig: Take 2,000 Units by mouth daily. Indications: PREVENTION OF VITAMIN D DEFICIENCY   famotidine (PEPCID) 40 mg tablet   Yes Yes   Sig: Take 40 mg by mouth nightly. Indications: gastroesophageal reflux disease   lovastatin (MEVACOR) 20 mg tablet   Yes Yes   Sig: Take 20 mg by mouth nightly. Indications: hyperlipidemia   mirtazapine (REMERON) 15 mg tablet   Yes Yes   Sig: Take 15 mg by mouth nightly. Indications: major depressive disorder   mycophenolate mofetil (CELLCEPT) 250 mg capsule   Yes Yes   Sig: Take 750 mg by mouth two (2) times a day. Indications: PREVENTION OF KIDNEY TRANSPLANT REJECTION   predniSONE (DELTASONE) 5 mg tablet   Yes Yes   Sig: Take 5 mg by mouth daily.  Indications: PREVENTION OF KIDNEY TRANSPLANT REJECTION   raNITIdine (ZANTAC) 150 mg tablet   Yes Yes   Sig: Take 150 mg by mouth daily. Indications: gastroesophageal reflux disease   sertraline (ZOLOFT) 100 mg tablet   Yes Yes   Sig: Take 150 mg by mouth daily. Indications: major depressive disorder   tacrolimus (PROGRAF) 1 mg capsule   Yes Yes   Sig: Take 6 mg by mouth two (2) times a day.  Indications: PREVENTION OF KIDNEY TRANSPLANT REJECTION      Facility-Administered Medications: None        Thank you,  Emilee Villagomez, PHARMD, BCPS

## 2018-08-23 PROCEDURE — 74011250637 HC RX REV CODE- 250/637: Performed by: INTERNAL MEDICINE

## 2018-08-23 PROCEDURE — 74011250637 HC RX REV CODE- 250/637: Performed by: PSYCHIATRY & NEUROLOGY

## 2018-08-23 PROCEDURE — 74011250636 HC RX REV CODE- 250/636: Performed by: INTERNAL MEDICINE

## 2018-08-23 PROCEDURE — 65220000003 HC RM SEMIPRIVATE PSYCH

## 2018-08-23 PROCEDURE — 74011636637 HC RX REV CODE- 636/637: Performed by: INTERNAL MEDICINE

## 2018-08-23 RX ORDER — TRIAMCINOLONE ACETONIDE 1 MG/G
CREAM TOPICAL 2 TIMES DAILY
Status: DISCONTINUED | OUTPATIENT
Start: 2018-08-23 | End: 2018-08-23 | Stop reason: SDUPTHER

## 2018-08-23 RX ORDER — ARIPIPRAZOLE 5 MG/1
2.5 TABLET ORAL DAILY
Status: DISCONTINUED | OUTPATIENT
Start: 2018-08-23 | End: 2018-08-24 | Stop reason: HOSPADM

## 2018-08-23 RX ORDER — AMLODIPINE BESYLATE 5 MG/1
5 TABLET ORAL
Status: DISCONTINUED | OUTPATIENT
Start: 2018-08-23 | End: 2018-08-24 | Stop reason: HOSPADM

## 2018-08-23 RX ORDER — TRIAMCINOLONE ACETONIDE 1 MG/G
CREAM TOPICAL 2 TIMES DAILY
Status: DISCONTINUED | OUTPATIENT
Start: 2018-08-23 | End: 2018-08-24 | Stop reason: HOSPADM

## 2018-08-23 RX ORDER — ATENOLOL 50 MG/1
50 TABLET ORAL
Status: DISCONTINUED | OUTPATIENT
Start: 2018-08-23 | End: 2018-08-24 | Stop reason: HOSPADM

## 2018-08-23 RX ADMIN — VITAMIN D 2000 UNITS: 25 TAB ORAL at 08:02

## 2018-08-23 RX ADMIN — TRIAMCINOLONE ACETONIDE: 1 CREAM TOPICAL at 21:59

## 2018-08-23 RX ADMIN — ARIPIPRAZOLE 2.5 MG: 5 TABLET ORAL at 14:45

## 2018-08-23 RX ADMIN — MIRTAZAPINE 15 MG: 15 TABLET, FILM COATED ORAL at 21:57

## 2018-08-23 RX ADMIN — TACROLIMUS 6 MG: 1 CAPSULE ORAL at 21:59

## 2018-08-23 RX ADMIN — TACROLIMUS 6 MG: 1 CAPSULE ORAL at 08:10

## 2018-08-23 RX ADMIN — PRAVASTATIN SODIUM 20 MG: 10 TABLET ORAL at 21:57

## 2018-08-23 RX ADMIN — AMLODIPINE BESYLATE 5 MG: 5 TABLET ORAL at 21:57

## 2018-08-23 RX ADMIN — ATENOLOL 50 MG: 50 TABLET ORAL at 21:57

## 2018-08-23 RX ADMIN — SERTRALINE HYDROCHLORIDE 200 MG: 50 TABLET ORAL at 08:02

## 2018-08-23 RX ADMIN — MYCOPHENOLATE MOFETIL 750 MG: 250 CAPSULE ORAL at 21:58

## 2018-08-23 RX ADMIN — PREDNISONE 5 MG: 5 TABLET ORAL at 08:02

## 2018-08-23 RX ADMIN — FAMOTIDINE 40 MG: 20 TABLET, FILM COATED ORAL at 21:57

## 2018-08-23 RX ADMIN — MYCOPHENOLATE MOFETIL 750 MG: 250 CAPSULE ORAL at 08:13

## 2018-08-23 NOTE — BH NOTES
PSYCHIATRIC PROGRESS NOTE         Patient Name  Saji Greene   Date of Birth 1955   [facility] [id number]   Medical Record Number  [id number]      Age  66 y.o. PCP Bryce Dos Santos MD   Admit date:  8/21/2018    Room Number  323/01  @ [facility]   Date of Service  8/23/2018          PSYCHOTHERAPY SESSION NOTE:  Length of psychotherapy session: 30 minutes    Main condition/diagnosis/issues treated during session today, 8/23/2018 : major depressive disorder    I employed Cognitive Behavioral therapy techniques, Reality-Oriented psychotherapy, as well as supportive psychotherapy in regards to various ongoing psychosocial stressors, including the following: pre-admission and current problems; medical issues and stress of hospitalization. Interpersonal relationship issues and psychodynamic conflicts explored. Attempts made to alleviate maladaptive patterns. Session focused on the patient's support system at home, and the therapeutic environment that will be best for the patient/     Overall, patient is progressing    Treatment Plan Update (reviewed an updated 8/23/2018) : I will modify psychotherapy tx plan by implementing more stress management strategies, building upon cognitive behavioral techniques, increasing coping skills, as well as shoring up psychological defenses). An extended energy and skill set was needed to engage pt in psychotherapy due to some of the following: resistiveness, complexity, negativity, confrontational nature, hostile behaviors, and/or severe abnormalities in thought processes/psychosis resulting in the loss of expressive/receptive language communication skills. E & M PROGRESS NOTE:         HISTORY       CC:  \"suicide attempt\"  HISTORY OF PRESENT ILLNESS/INTERVAL HISTORY:  (reviewed/updated 8/23/2018). per initial evaluation: The patient, Saji Greene, is a 66 y.o.   BLACK OR  female with a past psychiatric history significant for major depressive disorder, severe without psychotic features, who presents at this time with complaints of (and/or evidence of) the following emotional symptoms: suicide attempt via drowning. Additional symptomatology include feeling depressed, feeling suicidal and relationship difficulties. The above symptoms have been present for 2+ months. These symptoms are of high severity. These symptoms are constant. The patient's condition has been precipitated by psychosocial stressors and the 6 year anniversary of the death of the patient's daughter. Patient's condition made worse by discovering drugs at home, belonging to her grandson. UDS= negative; BAL=0. Anish Marks presents/reports/evidences the following emotional symptoms today, 8/23/2018:depression and suicide attempt by drowning. The above symptoms have been present for 2+ months. These symptoms are of high severity. The symptoms are constant. Additional symptomatology and features include concern about health problems. 8/23-patient tolerated increased medication dose. Visible on unit, family visiting, attending groups. Patient amenable to starting antidepressant adjunct. SIDE EFFECTS: (reviewed/updated 8/23/2018)  None reported or admitted to. ALLERGIES:(reviewed/updated 8/23/2018)  Allergies   Allergen Reactions    Heparin (Bovine) Hives    Hydralazine Hives      MEDICATIONS PRIOR TO ADMISSION:(reviewed/updated 8/23/2018)  Prescriptions Prior to Admission   Medication Sig    atenolol (TENORMIN) 50 mg tablet Take 50 mg by mouth daily. Indications: hypertension    mirtazapine (REMERON) 15 mg tablet Take 15 mg by mouth nightly. Indications: major depressive disorder    sertraline (ZOLOFT) 100 mg tablet Take 150 mg by mouth daily. Indications: major depressive disorder    predniSONE (DELTASONE) 5 mg tablet Take 5 mg by mouth daily.  Indications: PREVENTION OF KIDNEY TRANSPLANT REJECTION    mycophenolate mofetil (CELLCEPT) 250 mg capsule Take 750 mg by mouth two (2) times a day. Indications: PREVENTION OF KIDNEY TRANSPLANT REJECTION    tacrolimus (PROGRAF) 1 mg capsule Take 6 mg by mouth two (2) times a day. Indications: PREVENTION OF KIDNEY TRANSPLANT REJECTION    raNITIdine (ZANTAC) 150 mg tablet Take 150 mg by mouth daily. Indications: gastroesophageal reflux disease    famotidine (PEPCID) 40 mg tablet Take 40 mg by mouth nightly. Indications: gastroesophageal reflux disease    lovastatin (MEVACOR) 20 mg tablet Take 20 mg by mouth nightly. Indications: hyperlipidemia    acetaminophen (TYLENOL) 325 mg tablet Take 325 mg by mouth every four (4) hours as needed (headache).  cholecalciferol, vitamin D3, (VITAMIN D3) 2,000 unit Tab Take 2,000 Units by mouth daily. Indications: PREVENTION OF VITAMIN D DEFICIENCY    amLODIPine (NORVASC) 5 mg tablet Take 5 mg by mouth daily. Indications: hypertension      PAST MEDICAL HISTORY: Past medical history from the initial psychiatric evaluation has been reviewed (reviewed/updated 2018) with no additional updates (I asked patient and no additional past medical history provided). Past Medical History:   Diagnosis Date    Chronic kidney disease     Hearing loss     HTN (hypertension)     Hypercholesteremia     Leg cramps     Mixed hyperlipidemia      Past Surgical History:   Procedure Laterality Date    HX CATARACT REMOVAL      HX  SECTION        SOCIAL HISTORY: Social history from the initial psychiatric evaluation has been reviewed (reviewed/updated 2018) with no additional updates (I asked patient and no additional social history provided). Social History     Social History    Marital status:      Spouse name: N/A    Number of children: N/A    Years of education: N/A     Occupational History    Not on file.      Social History Main Topics    Smoking status: Never Smoker    Smokeless tobacco: Never Used    Alcohol use No    Drug use: No    Sexual activity: No     Other Topics Concern    Not on file     Social History Narrative    No narrative on file      FAMILY HISTORY: Family history from the initial psychiatric evaluation has been reviewed (reviewed/updated 2018) with no additional updates (I asked patient and no additional family history provided). Family History   Problem Relation Age of Onset    Heart Failure Father          Thyroid Disease Mother     Heart Failure Maternal Grandmother     Hypertension Maternal Grandmother        REVIEW OF SYSTEMS: (reviewed/updated 2018)  Appetite:improved   Sleep: improved   All other Review of Systems: Negative except per HPI         [facility] (MSE):    MSE FINDINGS ARE WITHIN NORMAL LIMITS (WNL) UNLESS OTHERWISE STATED BELOW. ( ALL OF THE BELOW CATEGORIES OF THE MSE HAVE BEEN REVIEWED (reviewed 2018) AND UPDATED AS DEEMED APPROPRIATE )  General Presentation age appropriate, cooperative   Orientation oriented to time, place and person   Vital Signs  See below (reviewed 2018); Vital Signs (BP, Pulse, & Temp) are within normal limits if not listed below.    Gait and Station Stable/steady, no ataxia   Musculoskeletal System No extrapyramidal symptoms (EPS); no abnormal muscular movements or Tardive Dyskinesia (TD); muscle strength and tone are within normal limits   Language No aphasia or dysarthria   Speech:  non-pressured and soft   Thought Processes logical; normal rate of thoughts; good abstract reasoning/computation   Thought Associations unremarkable   Thought Content free of delusions   Suicidal Ideations contracts for safety   Homicidal Ideations none   Mood:  depressed and anhedonia   Affect:  constricted and labile   Memory recent  intact   Memory remote:  intact   Concentration/Attention:  intact   Fund of Knowledge average   Insight:  fair   Reliability fair   Judgment:  poor          VITALS:     Patient Vitals for the past 24 hrs: Temp Pulse Resp BP   08/23/18 0700 97.5 °F (36.4 °C) (!) 53 16 142/86     Wt Readings from Last 3 Encounters:   08/22/18 67 kg (147 lb 11.2 oz)   05/15/13 56.7 kg (125 lb)   08/20/12 58.1 kg (128 lb)     Temp Readings from Last 3 Encounters:   08/23/18 97.5 °F (36.4 °C)   08/21/18 97.9 °F (36.6 °C)   08/20/12 98.2 °F (36.8 °C)     BP Readings from Last 3 Encounters:   08/23/18 142/86   08/21/18 (!) 147/102   05/15/13 116/80     Pulse Readings from Last 3 Encounters:   08/23/18 (!) 53   08/21/18 76   07/30/12 84            DATA     LABORATORY DATA:(reviewed/updated 8/23/2018)  No results found for this or any previous visit (from the past 24 hour(s)). No results found for: VALF2, VALAC, VALP, VALPR, DS6, CRBAM, CRBAMP, CARB2, XCRBAM  No results found for: LITHM   RADIOLOGY REPORTS:(reviewed/updated 8/23/2018)  No results found.        MEDICATIONS     ALL MEDICATIONS:   Current Facility-Administered Medications   Medication Dose Route Frequency    atenolol (TENORMIN) tablet 50 mg  50 mg Oral QHS    amLODIPine (NORVASC) tablet 5 mg  5 mg Oral QHS    ARIPiprazole (ABILIFY) tablet 2.5 mg  2.5 mg Oral DAILY    famotidine (PEPCID) tablet 40 mg  40 mg Oral QHS    pravastatin (PRAVACHOL) tablet 20 mg  20 mg Oral QHS    predniSONE (DELTASONE) tablet 5 mg  5 mg Oral DAILY    mycophenolate mofetil (CELLCEPT) capsule 750 mg  750 mg Oral BID    tacrolimus (PROGRAF) capsule 6 mg  6 mg Oral Q12H    sertraline (ZOLOFT) tablet 200 mg  200 mg Oral DAILY    mirtazapine (REMERON) tablet 15 mg  15 mg Oral QHS    ziprasidone (GEODON) 20 mg in sterile water (preservative free) 1 mL injection  20 mg IntraMUSCular BID PRN    OLANZapine (ZyPREXA) tablet 5 mg  5 mg Oral Q6H PRN    benztropine (COGENTIN) tablet 2 mg  2 mg Oral BID PRN    benztropine (COGENTIN) injection 2 mg  2 mg IntraMUSCular BID PRN    zolpidem (AMBIEN) tablet 10 mg  10 mg Oral QHS PRN    acetaminophen (TYLENOL) tablet 650 mg  650 mg Oral Q4H PRN    ibuprofen (MOTRIN) tablet 400 mg  400 mg Oral Q8H PRN    magnesium hydroxide (MILK OF MAGNESIA) 400 mg/5 mL oral suspension 30 mL  30 mL Oral DAILY PRN    nicotine (NICODERM CQ) 21 mg/24 hr patch 1 Patch  1 Patch TransDERmal DAILY PRN    cholecalciferol (VITAMIN D3) tablet 2,000 Units  2,000 Units Oral DAILY      SCHEDULED MEDICATIONS:   Current Facility-Administered Medications   Medication Dose Route Frequency    atenolol (TENORMIN) tablet 50 mg  50 mg Oral QHS    amLODIPine (NORVASC) tablet 5 mg  5 mg Oral QHS    ARIPiprazole (ABILIFY) tablet 2.5 mg  2.5 mg Oral DAILY    famotidine (PEPCID) tablet 40 mg  40 mg Oral QHS    pravastatin (PRAVACHOL) tablet 20 mg  20 mg Oral QHS    predniSONE (DELTASONE) tablet 5 mg  5 mg Oral DAILY    mycophenolate mofetil (CELLCEPT) capsule 750 mg  750 mg Oral BID    tacrolimus (PROGRAF) capsule 6 mg  6 mg Oral Q12H    sertraline (ZOLOFT) tablet 200 mg  200 mg Oral DAILY    mirtazapine (REMERON) tablet 15 mg  15 mg Oral QHS    cholecalciferol (VITAMIN D3) tablet 2,000 Units  2,000 Units Oral DAILY          ASSESSMENT & PLAN     DIAGNOSES REQUIRING ACTIVE TREATMENT AND MONITORING: (reviewed/updated 8/23/2018)  Patient Active Hospital Problem List:   Major depressive disorder, recurrent episode, severe ([facility] [address] (8/21/2018)    Assessment: acute stressor overlying chronic condition. Patient not a candidate for lithium therapy due to chronic kidney injury s/p allograft transplant. Will continue intensive therapy and medication regimen during ongoing crisis phase.     Plan:  - CONTINUE Zoloft 200 mg QDAY for depressive symptoms  - START Abilify 2.5 mg QDAY for depression adjunct  - CONTINUE Remeron 15 mg QHS for depression adjunct  - Expand database / obtain collateral  - I/G/M therapy as tolerated     Chronic kidney disease (5/21/2012)    Assessment: patient stable on home medications    Plan:  - CONTINUE home regimen  - Appreciate medicine recs      In summary, Jose Marks, is a 66 y.o.  female who presents with a severe exacerbation of the principal diagnosis of Major depressive disorder, recurrent episode, severe ([address]  Patient's condition is improving. Patient requires continued inpatient hospitalization for further stabilization, safety monitoring and medication management. I will continue to coordinate the provision of individual, milieu, occupational, group, and substance abuse therapies to address target symptoms/diagnoses as deemed appropriate for the individual patient. A coordinated, multidisplinary treatment team round was conducted with the patient (this team consists of the nurse, psychiatric unit pharmcist,  and writer). Complete current electronic health record for patient has been reviewed today including consultant notes, ancillary staff notes, nurses and psychiatric tech notes. Suicide risk assessment completed and patient deemed to be of moderate-high risk for suicide at this time. The following regarding medications was addressed during rounds with patient:   the risks and benefits of the proposed medication. The patient was given the opportunity to ask questions. Informed consent given to the use of the above medications. Will continue to adjust psychiatric and non-psychiatric medications (see above \"medication\" section and orders section for details) as deemed appropriate & based upon diagnoses and response to treatment. I will continue to order blood tests/labs and diagnostic tests as deemed appropriate and review results as they become available (see orders for details and above listed lab/test results). I will order psychiatric records from previous [facility] hospitals to further elucidate the nature of patient's psychopathology and review once available.     I will gather additional collateral information from friends, family and o/p treatment team to further elucidate the nature of patient's psychopathology and baselline level of psychiatric functioning. I certify that this patient's inpatient psychiatric hospital services furnished since the previous certification were, and continue to be, required for treatment that could reasonably be expected to improve the patient's condition, or for diagnostic study, and that the patient continues to need, on a daily basis, active treatment furnished directly by or requiring the supervision of inpatient psychiatric facility personnel. In addition, the hospital records show that services furnished were intensive treatment services, admission or related services, or equivalent services.     EXPECTED DISCHARGE DATE/DAY: 8/24/18     DISPOSITION: Home       Signed By:   Lacy Paz MD  8/23/2018

## 2018-08-23 NOTE — BH NOTES
Patient slept for 6 hours, breathing was unlabored. There is no noticeable distress at this time. Staff will continue to monitor patient. Q 15 minutes check maintained for safety.

## 2018-08-23 NOTE — PROGRESS NOTES
Laboratory monitoring for mood stabilizer and antipsychotics:    Recommended baseline monitoring has been completed based on this patient's current medication regimen. The patient is currently taking the following medication(s):   Current Facility-Administered Medications   Medication Dose Route Frequency    atenolol (TENORMIN) tablet 50 mg  50 mg Oral QHS    amLODIPine (NORVASC) tablet 5 mg  5 mg Oral QHS    ARIPiprazole (ABILIFY) tablet 2.5 mg  2.5 mg Oral DAILY    pantothenic ac-min oil-pet,hyd (AQUAPHOR) 41 % ointment (Patient Supplied)   Topical Q12H    triamcinolone acetonide (KENALOG) 0.1 % cream (Patient Supplied)   Topical BID    famotidine (PEPCID) tablet 40 mg  40 mg Oral QHS    pravastatin (PRAVACHOL) tablet 20 mg  20 mg Oral QHS    predniSONE (DELTASONE) tablet 5 mg  5 mg Oral DAILY    mycophenolate mofetil (CELLCEPT) capsule 750 mg  750 mg Oral BID    tacrolimus (PROGRAF) capsule 6 mg  6 mg Oral Q12H    sertraline (ZOLOFT) tablet 200 mg  200 mg Oral DAILY    mirtazapine (REMERON) tablet 15 mg  15 mg Oral QHS    cholecalciferol (VITAMIN D3) tablet 2,000 Units  2,000 Units Oral DAILY       Height, Weight, BMI Estimation  Estimated body mass index is 26.16 kg/(m^2) as calculated from the following:    Height as of this encounter: 160 cm (63\"). Weight as of this encounter: 67 kg (147 lb 11.2 oz). Renal Function, Hepatic Function and Chemistry  Estimated Creatinine Clearance: 61 mL/min (based on Cr of 0.89).     Lab Results   Component Value Date/Time    Sodium 141 08/20/2018 07:16 PM    Potassium 3.9 08/20/2018 07:16 PM    Chloride 108 08/20/2018 07:16 PM    CO2 23 08/20/2018 07:16 PM    Anion gap 10 08/20/2018 07:16 PM    Glucose 128 (H) 08/20/2018 07:16 PM    BUN 13 08/20/2018 07:16 PM    Creatinine 0.89 08/20/2018 07:16 PM    BUN/Creatinine ratio 15 08/20/2018 07:16 PM    GFR est AA >60 08/20/2018 07:16 PM    GFR est non-AA >60 08/20/2018 07:16 PM    Calcium 9.8 08/20/2018 07:16 PM    ALT (SGPT) 22 08/20/2018 07:16 PM    AST (SGOT) 17 08/20/2018 07:16 PM    Alk.  phosphatase 70 08/20/2018 07:16 PM    Protein, total 8.1 08/20/2018 07:16 PM    Albumin 4.2 08/20/2018 07:16 PM    Globulin 3.9 08/20/2018 07:16 PM    A-G Ratio 1.1 08/20/2018 07:16 PM    Bilirubin, total 0.8 08/20/2018 07:16 PM       Lab Results   Component Value Date/Time    Glucose 128 (H) 08/20/2018 07:16 PM       Lab Results   Component Value Date/Time    Hemoglobin A1c 5.9 08/24/2018 04:44 AM       Hematology  Lab Results   Component Value Date/Time    WBC 5.3 08/20/2018 07:16 PM    HGB 13.6 08/20/2018 07:16 PM    HCT 40.3 08/20/2018 07:16 PM    PLATELET 539 09/33/4589 07:16 PM    MCV 85.7 08/20/2018 07:16 PM       Lipids  Lab Results   Component Value Date/Time    Cholesterol, total 221 (H) 08/24/2018 04:44 AM    HDL Cholesterol 65 08/24/2018 04:44 AM    LDL, calculated 104.4 (H) 08/24/2018 04:44 AM    Triglyceride 258 (H) 08/24/2018 04:44 AM    CHOL/HDL Ratio 3.4 08/24/2018 04:44 AM       Thyroid Function    Lab Results   Component Value Date/Time    TSH 4.19 (H) 08/24/2018 04:44 AM     Vitals  Visit Vitals    /78    Pulse (!) 57    Temp 97.7 °F (36.5 °C)    Resp 16    Ht 160 cm (63\")    Wt 67 kg (147 lb 11.2 oz)    SpO2 99%    BMI 26.16 kg/m2       Teresa Lucio, PharmD, BCPS  [phone] (pharmacy)

## 2018-08-23 NOTE — PROGRESS NOTES
GROUP THERAPY PROGRESS NOTE      Redelvacamila Marks was present for medication group. GROUP TIME: 45 minutes, Thursdays 2pm    PERSONAL GOAL FOR PARTICIPATION: To be present for group, participate in discussion, and answer patient-directed questions. GOAL ORIENTATION: Personal    THERAPEUTIC INTERVENTIONS REVIEWED AND DISCUSSED: The following topics were presented: storage of medications, how to remember to refill medications and keep up with doctor appointments, relapse prevention, keeping a record of all medication including prescription and non-prescription drugs, and who to contact with medication questions. Patients were given time to ask questions regarding their current therapy. IMPRESSION OF PARTICIPATION: Patient was an active participant in group discussion and bingo. Patient asked questions at the end about keeping a medication list and shared her current methods of medication management. [place] [employer]  Pharm D.  Candidate, Class of 2019

## 2018-08-23 NOTE — BH NOTES
Behavior  The patient was alert and oriented and seen on the unit. Her hygiene and nutritional intake was adequate. Her behavior remains appropriate in the milieu with peers and staff, but presented reserved, yet friendly. Patient contracts for safety. Pt was medication compliant, per medication nurse. Patient exhibited an increase in her affect. Mood pleasant upon approach. Intervention  1:1 interaction to assess mood and thought process. Staff offerred assistance as needed. Response  Pt denied S/H ideations. Pt denied hearing voices at this time. Pt showed no signs of distress, agitation or anxiety. Pt attended group today. Plan  Plan is to assess mood and thought process. Staff encouraging verbalization of issues and feelings in the appropriate manner. Staff will monitor for changes. Will continue to check on pt Q15 minutes.

## 2018-08-23 NOTE — BH NOTES
Pt seems improved, less crying from yesterday. Engaged with group  Events and compliant with meals and medications. Will continue to monitor patient's status and assess needs.

## 2018-08-24 VITALS
HEART RATE: 57 BPM | WEIGHT: 147.7 LBS | HEIGHT: 63 IN | DIASTOLIC BLOOD PRESSURE: 78 MMHG | SYSTOLIC BLOOD PRESSURE: 137 MMHG | TEMPERATURE: 97.7 F | BODY MASS INDEX: 26.17 KG/M2 | RESPIRATION RATE: 16 BRPM | OXYGEN SATURATION: 99 %

## 2018-08-24 LAB
CHOLEST SERPL-MCNC: 221 MG/DL
EST. AVERAGE GLUCOSE BLD GHB EST-MCNC: 123 MG/DL
HBA1C MFR BLD: 5.9 % (ref 4.2–6.3)
HDLC SERPL-MCNC: 65 MG/DL
HDLC SERPL: 3.4 {RATIO} (ref 0–5)
LDLC SERPL CALC-MCNC: 104.4 MG/DL (ref 0–100)
LIPID PROFILE,FLP: ABNORMAL
TRIGL SERPL-MCNC: 258 MG/DL (ref ?–150)
TSH SERPL DL<=0.05 MIU/L-ACNC: 4.19 UIU/ML (ref 0.36–3.74)
VLDLC SERPL CALC-MCNC: 51.6 MG/DL

## 2018-08-24 PROCEDURE — 74011636637 HC RX REV CODE- 636/637: Performed by: INTERNAL MEDICINE

## 2018-08-24 PROCEDURE — 36415 COLL VENOUS BLD VENIPUNCTURE: CPT | Performed by: PSYCHIATRY & NEUROLOGY

## 2018-08-24 PROCEDURE — 74011250637 HC RX REV CODE- 250/637: Performed by: INTERNAL MEDICINE

## 2018-08-24 PROCEDURE — 74011250637 HC RX REV CODE- 250/637: Performed by: PSYCHIATRY & NEUROLOGY

## 2018-08-24 PROCEDURE — 83036 HEMOGLOBIN GLYCOSYLATED A1C: CPT | Performed by: PSYCHIATRY & NEUROLOGY

## 2018-08-24 PROCEDURE — 74011250636 HC RX REV CODE- 250/636: Performed by: INTERNAL MEDICINE

## 2018-08-24 PROCEDURE — 80061 LIPID PANEL: CPT | Performed by: PSYCHIATRY & NEUROLOGY

## 2018-08-24 PROCEDURE — 84443 ASSAY THYROID STIM HORMONE: CPT | Performed by: PSYCHIATRY & NEUROLOGY

## 2018-08-24 RX ORDER — SERTRALINE HYDROCHLORIDE 100 MG/1
200 TABLET, FILM COATED ORAL DAILY
Qty: 14 TAB | Refills: 0 | Status: SHIPPED | OUTPATIENT
Start: 2018-08-25

## 2018-08-24 RX ORDER — MIRTAZAPINE 15 MG/1
15 TABLET, FILM COATED ORAL
Qty: 14 TAB | Refills: 0 | Status: SHIPPED | OUTPATIENT
Start: 2018-08-24

## 2018-08-24 RX ORDER — ARIPIPRAZOLE 2 MG/1
2 TABLET ORAL DAILY
Qty: 14 TAB | Refills: 0 | Status: SHIPPED | OUTPATIENT
Start: 2018-08-25

## 2018-08-24 RX ADMIN — ARIPIPRAZOLE 2.5 MG: 5 TABLET ORAL at 09:12

## 2018-08-24 RX ADMIN — TRIAMCINOLONE ACETONIDE: 1 CREAM TOPICAL at 09:12

## 2018-08-24 RX ADMIN — TACROLIMUS 6 MG: 1 CAPSULE ORAL at 09:14

## 2018-08-24 RX ADMIN — PREDNISONE 5 MG: 5 TABLET ORAL at 09:12

## 2018-08-24 RX ADMIN — SERTRALINE HYDROCHLORIDE 200 MG: 50 TABLET ORAL at 09:12

## 2018-08-24 RX ADMIN — VITAMIN D 2000 UNITS: 25 TAB ORAL at 09:12

## 2018-08-24 RX ADMIN — MYCOPHENOLATE MOFETIL 750 MG: 250 CAPSULE ORAL at 09:14

## 2018-08-24 NOTE — PROGRESS NOTES
Pharmacy Progress Note    Patient was counseled on the following medication(s), Abilify. Patient was provided the opportunity to ask questions and all questions were answered. Thank you,  Logan Huffman  Pharm D.  Candidate, Class of 2019  [phone]

## 2018-08-24 NOTE — BH NOTES
GROUP THERAPY PROGRESS NOTE    The patient Mony willson 66 y.o. female is participating in Coping Skills Group. Group time: 45 minutes    Personal goal for participation: To participate in self esteem [employer] Education game    Goal orientation:  personal    Group therapy participation: active    Therapeutic interventions reviewed and discussed: things pertaining to self esteem    Impression of participation:  The patient was attentive.     Kedar Shea  8/24/2018  1:51 PM

## 2018-08-24 NOTE — PROGRESS NOTES
Problem: Depressed Mood (Adult/Pediatric)  Goal: *STG: Demonstrates reduction in symptoms and increase in insight into coping skills/future focused  Outcome: Progressing Towards Goal  Pt is alert and oriented. Denies SI/HI and contracts for safety. Pt denies psychotic symptoms. Pt is able to reality test. Pt is compliant with medication and meals. Pt attends groups and activities. Pt interacts appropriately with staff and peers. Pt is self care with ADLS. Pt participates in discharge planning. Continue to assess mood an behavior. Assist to reality test. Monitor on Q 15 checks.

## 2018-08-24 NOTE — BH NOTES
[facility]  IOP GROUP THERAPY NOTE    IOP Treatment Group: Process    Group Description: Education group led by unit staff.  Patients are educated on skills designed to support the patient's management of their diagnosis    Additional Description of Group: REFLECTION    Session Goal: Patient will gain understanding and treatment of their diagnosis    Group Facilitator: Leisa GIPSON    Co-Facilitator: ROBIN    Date: 8/23/18  Time: 9P  Duration (Minutes): 45MIN    Method: Free discussion    Patient Attendance: 100%    Patient Level of Participation: Participated with prompts    Patient Behavior/Symptoms: Appropriate to discussion    Patient Progress: Appears to be stable    Patient Progress Note: SEE PROGRESS NOTE

## 2018-08-24 NOTE — DISCHARGE SUMMARY
PSYCHIATRIC DISCHARGE SUMMARY         IDENTIFICATION:    Patient Name  Mony Canas   Date of Birth 1955   [facility] [id number]   Medical Record Number  [id number]      Age  66 y.o. PCP Helen Perrin MD   Admit date:  8/21/2018    Discharge date: 8/24/2018   Room Number  324/01  @ [facility]   Date of Service  8/24/2018            TYPE OF DISCHARGE: REGULAR               CONDITION AT DISCHARGE: improved       PROVISIONAL & DISCHARGE DIAGNOSES:    Problem List  Date Reviewed: 5/19/2013          Codes Class    * (Principal)Major depressive disorder, recurrent episode, severe (HCC) ICD-10-CM: F33.2  ICD-9-CM: 296.33         Microscopic hematuria ICD-10-CM: R31.29  ICD-9-CM: 599.72         Urethral caruncle ICD-10-CM: N36.2  ICD-9-CM: 599.3         Hx of dehydration ICD-10-CM: Z86.39  ICD-9-CM: V12.29         Gastritis ICD-10-CM: K29.70  ICD-9-CM: 535.50         Dysphagia ICD-10-CM: R13.10  ICD-9-CM: 787.20         Chronic kidney disease ICD-10-CM: N18.9  ICD-9-CM: 585.9         HTN (hypertension) ICD-10-CM: I10  ICD-9-CM: 401.9         Mixed hyperlipidemia ICD-10-CM: E78.2  ICD-9-CM: 272.2         Reflux ICD-10-CM: K21.9  ICD-9-CM: 530.81         Hearing loss ICD-10-CM: H91.90  ICD-9-CM: 389.9         Urethral prolapse ICD-10-CM: N36.8  ICD-9-CM: 599.5         Urinary retention ICD-10-CM: R33.9  ICD-9-CM: 788.20               Active Hospital Problems    *Major depressive disorder, recurrent episode, severe ([address]      Chronic kidney disease        DISCHARGE DIAGNOSIS:   Axis I:  SEE ABOVE  Axis II: SEE ABOVE  Axis III: SEE ABOVE  Axis IV:  Familial stressors  Axis V:  40 on admission, 60 on discharge 80 (baseline)       CC & HISTORY OF PRESENT ILLNESS:  \"Suicide attempt\"    The patient, Mony Canas, is a 66 y.o.   BL[place]  female with a past psychiatric history significant for major depressive disorder, severe without psychotic features, who presents at this time with complaints of (and/or evidence of) the following emotional symptoms: suicide attempt via drowning. Additional symptomatology include feeling depressed, feeling suicidal and relationship difficulties. The above symptoms have been present for 2+ months. These symptoms are of high severity. These symptoms are constant. The patient's condition has been precipitated by psychosocial stressors and the 6 year anniversary of the death of the patient's daughter. Patient's condition made worse by discovering drugs at home, belonging to her grandson. UDS= negative; BAL=0.      SOCIAL HISTORY:    Social History     Social History    Marital status:      Spouse name: N/A    Number of children: N/A    Years of education: N/A     Occupational History    Not on file. Social History Main Topics    Smoking status: Never Smoker    Smokeless tobacco: Never Used    Alcohol use No    Drug use: No    Sexual activity: No     Other Topics Concern    Not on file     Social History Narrative    No narrative on file      FAMILY HISTORY:   Family History   Problem Relation Age of Onset    Heart Failure Father          Thyroid Disease Mother     Heart Failure Maternal Grandmother     Hypertension Maternal Grandmother              HOSPITALIZATION COURSE:    Steve Marks was admitted to the inpatient psychiatric unit [address] [place] [address] for acute psychiatric stabilization in regards to symptomatology as described in the HPI above. The differential diagnosis at time of admission included: Major depressive disorder vs bipolar disorder. While on the unit Kiera Marks was involved in individual, group, occupational and milieu therapy. Psychiatric medications were adjusted during this hospitalization including Abilify Zoloft and Remeron. Steve Marks demonstrated a slow, but progressive improvement in overall condition. Much of patient's depression appeared to be related to situational factors.   Please see individual progress notes for more specific details regarding patient's hospitalization course. At time of discharge, Karl Montero is without significant problems of depression. Patient free of suicidal and homicidal ideations (appears to be at very low risk of suicide or homicide) and reports many positive predictive factors in terms of not attempting suicide or homicide. Overall presentation at time of discharge is most consistent with the diagnosis of major depressive disorder. Patient has maximized benefit to be derived from acute inpatient psychiatric treatment. All members of the treatment team concur with each other in regards to plans for discharge today. Patient and family are aware and in agreement with discharge and discharge plan.          LABS AND IMAGAING:    Labs Reviewed   LIPID PANEL - Abnormal; Notable for the following:        Result Value    Cholesterol, total 221 (*)     Triglyceride 258 (*)     LDL, calculated 104.4 (*)     All other components within normal limits   TSH 3RD GENERATION - Abnormal; Notable for the following:     TSH 4.19 (*)     All other components within normal limits   HEMOGLOBIN A1C WITH EAG     No results found for: DS35, PHEN, PHENO, PHENT, DILF, DS39, PHENY, PTN, VALF2, VALAC, VALP, VALPR, DS6, CRBAM, CRBAMP, CARB2, XCRBAM  Admission on 08/21/2018   Component Date Value Ref Range Status    Hemoglobin A1c 08/24/2018 5.9  4.2 - 6.3 % Final    Est. average glucose 08/24/2018 123  mg/dL Final    LIPID PROFILE 08/24/2018        Final    Cholesterol, total 08/24/2018 221* <200 MG/DL Final    Triglyceride 08/24/2018 258* <150 MG/DL Final    HDL Cholesterol 08/24/2018 65  MG/DL Final    LDL, calculated 08/24/2018 104.4* 0 - 100 MG/DL Final    VLDL, calculated 08/24/2018 51.6  MG/DL Final    CHOL/HDL Ratio 08/24/2018 3.4  0 - 5.0   Final    TSH 08/24/2018 4.19* 0.36 - 3.74 uIU/mL Final   Admission on 08/20/2018, Discharged on 08/21/2018   Component Date Value Ref Range Status    WBC 08/20/2018 5.3  4.6 - 13.2 K/uL Final    RBC 08/20/2018 4.70  4.20 - 5.30 M/uL Final    HGB 08/20/2018 13.6  12.0 - 16.0 g/dL Final    HCT 08/20/2018 40.3  35.0 - 45.0 % Final    MCV 08/20/2018 85.7  74.0 - 97.0 FL Final    MCH 08/20/2018 28.9  24.0 - 34.0 PG Final    MCHC 08/20/2018 33.7  31.0 - 37.0 g/dL Final    RDW 08/20/2018 15.0* 11.6 - 14.5 % Final    PLATELET 93/94/2751 711  135 - 420 K/uL Final    MPV 08/20/2018 10.4  9.2 - 11.8 FL Final    NEUTROPHILS 08/20/2018 68  40 - 73 % Final    LYMPHOCYTES 08/20/2018 23  21 - 52 % Final    MONOCYTES 08/20/2018 8  3 - 10 % Final    EOSINOPHILS 08/20/2018 1  0 - 5 % Final    BASOPHILS 08/20/2018 0  0 - 2 % Final    ABS. NEUTROPHILS 08/20/2018 3.6  1.8 - 8.0 K/UL Final    ABS. LYMPHOCYTES 08/20/2018 1.2  0.9 - 3.6 K/UL Final    ABS. MONOCYTES 08/20/2018 0.4  0.05 - 1.2 K/UL Final    ABS. EOSINOPHILS 08/20/2018 0.0  0.0 - 0.4 K/UL Final    ABS. BASOPHILS 08/20/2018 0.0  0.0 - 0.1 K/UL Final    DF 08/20/2018 AUTOMATED    Final    Sodium 08/20/2018 141  136 - 145 mmol/L Final    Potassium 08/20/2018 3.9  3.5 - 5.5 mmol/L Final    Chloride 08/20/2018 108  100 - 108 mmol/L Final    CO2 08/20/2018 23  21 - 32 mmol/L Final    Anion gap 08/20/2018 10  3.0 - 18 mmol/L Final    Glucose 08/20/2018 128* 74 - 99 mg/dL Final    BUN 08/20/2018 13  7.0 - 18 MG/DL Final    Creatinine 08/20/2018 0.89  0.6 - 1.3 MG/DL Final    BUN/Creatinine ratio 08/20/2018 15  12 - 20   Final    GFR est AA 08/20/2018 >60  >60 ml/min/1.73m2 Final    GFR est non-AA 08/20/2018 >60  >60 ml/min/1.73m2 Final    Calcium 08/20/2018 9.8  8.5 - 10.1 MG/DL Final    Bilirubin, total 08/20/2018 0.8  0.2 - 1.0 MG/DL Final    ALT (SGPT) 08/20/2018 22  13 - 56 U/L Final    AST (SGOT) 08/20/2018 17  15 - 37 U/L Final    Alk.  phosphatase 08/20/2018 70  45 - 117 U/L Final    Protein, total 08/20/2018 8.1  6.4 - 8.2 g/dL Final    Albumin 08/20/2018 4.2  3.4 - 5.0 g/dL Final    Globulin 08/20/2018 3.9  2.0 - 4.0 g/dL Final    A-G Ratio 08/20/2018 1.1  0.8 - 1.7   Final    ALCOHOL(ETHYL),SERUM 08/20/2018 <3  0 - 3 MG/DL Final    Color 08/20/2018 YELLOW    Final    Appearance 08/20/2018 CLEAR    Final    Specific gravity 08/20/2018 1.018  1.005 - 1.030   Final    pH (UA) 08/20/2018 5.5  5.0 - 8.0   Final    Protein 08/20/2018 30* NEG mg/dL Final    Glucose 08/20/2018 NEGATIVE   NEG mg/dL Final    Ketone 08/20/2018 NEGATIVE   NEG mg/dL Final    Bilirubin 08/20/2018 NEGATIVE   NEG   Final    Blood 08/20/2018 NEGATIVE   NEG   Final    Urobilinogen 08/20/2018 0.2  0.2 - 1.0 EU/dL Final    Nitrites 08/20/2018 NEGATIVE   NEG   Final    Leukocyte Esterase 08/20/2018 NEGATIVE   NEG   Final    BENZODIAZEPINES 08/20/2018 NEGATIVE   NEG   Final    BARBITURATES 08/20/2018 NEGATIVE   NEG   Final    THC (TH-CANNABINOL) 08/20/2018 NEGATIVE   NEG   Final    OPIATES 08/20/2018 NEGATIVE   NEG   Final    PCP(PHENCYCLIDINE) 08/20/2018 NEGATIVE   NEG   Final    COCAINE 08/20/2018 NEGATIVE   NEG   Final    AMPHETAMINES 08/20/2018 NEGATIVE   NEG   Final    METHADONE 08/20/2018 NEGATIVE   NEG   Final    HDSCOM 08/20/2018 (NOTE)   Final    Salicylate level 36/62/8345 <2.8* 2.8 - 20.0 MG/DL Final    Acetaminophen level 08/20/2018 <2* 10.0 - 30.0 ug/mL Final    WBC 08/20/2018 0 to 2  0 - 4 /hpf Final    RBC 08/20/2018 0  0 - 5 /hpf Final    Epithelial cells 08/20/2018 FEW  0 - 5 /lpf Final    Bacteria 08/20/2018 NEGATIVE   NEG /hpf Final     No results found. DISPOSITION:    Home. Patient to f/u with psychiatric and psychotherapy appointments. Patient is to f/u with internist as directed. FOLLOW-UP CARE:    Activity as tolerated  Regular Diet  Wound Care: none needed.   Follow-up Information     Follow up With Details Comments Contact Info    fAMILY mEDICAL pRACTIONER   Appointment Julian Bright MD 8/30/18 @ 10:30AM    [address] [facility] [address] [place] [address]   [employer], [address] [facility]     Discharge RN  Please return patient's creams to her on discharge. Creams are located in the patient specific bin in the medication room. MD Maximiliano Mauro [address] [phone]                   PROGNOSIS:   Good ---- based on nature of patient's pathology/ies and treatment compliance issues. Prognosis is greatly dependent upon patient's ability to follow up with psychiatric/psychotherapy appointments as well as to comply with psychiatric medications as prescribed. DISCHARGE MEDICATIONS:    Informed consent given for the use of following psychotropic medications:  Current Discharge Medication List      START taking these medications    Details   ARIPiprazole (ABILIFY) 2 mg tablet Take 1 Tab by mouth daily. Indications: DEPRESSION TREATMENT ADJUNCT  Qty: 14 Tab, Refills: 0         CONTINUE these medications which have CHANGED    Details   sertraline (ZOLOFT) 100 mg tablet Take 2 Tabs by mouth daily. Indications: major depressive disorder  Qty: 14 Tab, Refills: 0      !! mirtazapine (REMERON) 15 mg tablet Take 1 Tab by mouth nightly. Indications: major depressive disorder  Qty: 14 Tab, Refills: 0       !! - Potential duplicate medications found. Please discuss with provider. CONTINUE these medications which have NOT CHANGED    Details   atenolol (TENORMIN) 50 mg tablet Take 50 mg by mouth daily. Indications: hypertension      !! mirtazapine (REMERON) 15 mg tablet Take 15 mg by mouth nightly. Indications: major depressive disorder      predniSONE (DELTASONE) 5 mg tablet Take 5 mg by mouth daily. Indications: PREVENTION OF KIDNEY TRANSPLANT REJECTION      mycophenolate mofetil (CELLCEPT) 250 mg capsule Take 750 mg by mouth two (2) times a day. Indications: PREVENTION OF KIDNEY TRANSPLANT REJECTION      raNITIdine (ZANTAC) 150 mg tablet Take 150 mg by mouth daily.  Indications: gastroesophageal reflux disease      famotidine (PEPCID) 40 mg tablet Take 40 mg by mouth nightly. Indications: gastroesophageal reflux disease      lovastatin (MEVACOR) 20 mg tablet Take 20 mg by mouth nightly. Indications: hyperlipidemia      acetaminophen (TYLENOL) 325 mg tablet Take 325 mg by mouth every four (4) hours as needed (headache). cholecalciferol, vitamin D3, (VITAMIN D3) 2,000 unit Tab Take 2,000 Units by mouth daily. Indications: PREVENTION OF VITAMIN D DEFICIENCY      amLODIPine (NORVASC) 5 mg tablet Take 5 mg by mouth daily. Indications: hypertension       !! - Potential duplicate medications found. Please discuss with provider. A coordinated, multidisplinary treatment team round was conducted with Gwen Dubin is done daily here at [facility]. This team consists of the nurse, psychiatric unit pharmcist,  and writer. I have spent greater than 35 minutes on discharge work.     Signed:  Duane King MD  8/24/2018  2

## 2018-08-24 NOTE — DISCHARGE INSTRUCTIONS
DISCHARGE SUMMARY from Nurse    The following personal items are in your possession at time of discharge:    Dental Appliances: None  Visual Aid: Glasses, With patient     Home Medications: None  Jewelry: With patient  Clothing: At bedside  Other Valuables: None         PATIENT INSTRUCTIONS:    If I feel that I can not keep these promises and I am at risk of hurting myself or others, I will call the crisis office and speak with a crisis worker who will assist me during my crisis. [facility] Crisis  [address]       Lifestyle Tips:  Appointment after discharge and follow up phone call:   After being discharged, if your appointment does not work for you, please feel free to contact the physician's office to change the appointment. Medications: Take your medicines exactly as instructed. Ask your doctor or nurse if you have questions about your medicines. Tell your doctor right away if you have problems with your medicines. Activity:   Ask your doctor how active you should be. Remember to take rest breaks. Do not cross your legs when sitting. Elevate your legs when you can. Carry a list of your medications, allergies and the shots you have had              These are general instructions for a healthy lifestyle:    No smoking/ No tobacco products/ Avoid exposure to second hand smoke    Surgeon General's Warning:  Quitting smoking now greatly reduces serious risk to your health.     Obesity, smoking, and sedentary lifestyle greatly increases your risk for illness    A healthy diet, regular physical exercise & weight monitoring are important for maintaining a healthy lifestyle        Recognize signs and symptoms of STROKE:    F-face looks uneven    A-arms unable to move or move unevenly    S-speech slurred or non-existent    T-time-call 911 as soon as signs and symptoms begin-DO NOT go       Back to bed or wait to see if you get better-TIME IS BRAIN. Warning Signs of HEART ATTACK     Call 911 if you have these symptoms:   Chest discomfort. Most heart attacks involve discomfort in the center of the chest that lasts more than a few minutes, or that goes away and comes back. It can feel like uncomfortable pressure, squeezing, fullness, or pain.  Discomfort in other areas of the upper body. Symptoms can include pain or discomfort in one or both arms, the back, neck, jaw, or stomach.  Shortness of breath with or without chest discomfort.  Other signs may include breaking out in a cold sweat, nausea, or lightheadedness. Don't wait more than five minutes to call 911 - MINUTES MATTER! Fast action can save your life. Calling 911 is almost always the fastest way to get lifesaving treatment. Emergency Medical Services staff can begin treatment when they arrive -- up to an hour sooner than if someone gets to the hospital by car. Myself and/or my family have received education about my diagnosis throughout my hospital stay. During my hospital stay, I and/or my family/caregiver were included in planning my care upon discharge. My needs were taken into consideration and I was included in my discharge planning. I had an opportunity to ask questions. The discharge information has been reviewed with the patient. The patient verbalized understanding. Discharge medications reviewed with the patient and appropriate educational materials and side effects teaching were provided.

## 2018-08-24 NOTE — PROGRESS NOTES
Problem: Depressed Mood (Adult/Pediatric)  Goal: *STG: Participates in treatment plan  Outcome: Progressing Towards Goal  Pt slept 5 hours. Labs drawn. Respirations even and unlabored. No concerns overnight. Staff will continue to monitor for health and safety.